# Patient Record
Sex: MALE | Race: WHITE | NOT HISPANIC OR LATINO | Employment: UNEMPLOYED | URBAN - METROPOLITAN AREA
[De-identification: names, ages, dates, MRNs, and addresses within clinical notes are randomized per-mention and may not be internally consistent; named-entity substitution may affect disease eponyms.]

---

## 2022-09-27 ENCOUNTER — OFFICE VISIT (OUTPATIENT)
Dept: FAMILY MEDICINE CLINIC | Facility: CLINIC | Age: 22
End: 2022-09-27

## 2022-09-27 VITALS
SYSTOLIC BLOOD PRESSURE: 142 MMHG | OXYGEN SATURATION: 98 % | DIASTOLIC BLOOD PRESSURE: 96 MMHG | HEART RATE: 114 BPM | TEMPERATURE: 96.8 F | BODY MASS INDEX: 37.19 KG/M2 | WEIGHT: 315 LBS | HEIGHT: 77 IN

## 2022-09-27 DIAGNOSIS — Z76.89 ENCOUNTER TO ESTABLISH CARE: Primary | ICD-10-CM

## 2022-09-27 DIAGNOSIS — E79.0 HYPERURICEMIA: ICD-10-CM

## 2022-09-27 DIAGNOSIS — M10.9 ACUTE GOUT OF RIGHT ANKLE, UNSPECIFIED CAUSE: ICD-10-CM

## 2022-09-27 DIAGNOSIS — M10.9 ACUTE GOUT INVOLVING TOE OF RIGHT FOOT, UNSPECIFIED CAUSE: ICD-10-CM

## 2022-09-27 DIAGNOSIS — S39.012A STRAIN OF MUSCLE, FASCIA AND TENDON OF LOWER BACK, INITIAL ENCOUNTER: ICD-10-CM

## 2022-09-27 DIAGNOSIS — Z71.3 ENCOUNTER FOR WEIGHT LOSS COUNSELING: ICD-10-CM

## 2022-09-27 DIAGNOSIS — Z71.6 ENCOUNTER FOR SMOKING CESSATION COUNSELING: ICD-10-CM

## 2022-09-27 PROCEDURE — 99214 OFFICE O/P EST MOD 30 MIN: CPT | Performed by: STUDENT IN AN ORGANIZED HEALTH CARE EDUCATION/TRAINING PROGRAM

## 2022-09-27 RX ORDER — IBUPROFEN 800 MG/1
800 TABLET ORAL 3 TIMES DAILY
Qty: 30 TABLET | Refills: 1 | Status: SHIPPED | OUTPATIENT
Start: 2022-09-27

## 2022-09-27 NOTE — PATIENT INSTRUCTIONS
Based on your symptoms and exam today, as well as review of your urgent care record, I do believe you are having a gout flare.  Your symptoms appear to be resolving.  I am not going to prescribe anything specifically for your gout today, but will instead wait for lab results.  You should hear back from us in a couple of days.  Also try to limit the purines in your diet to help keep from having more gout flares.  If your symptoms worsen feel free to give us a call or go back to urgent care.    You appear to have a muscle/tendon strain in your back.  Take the 800 mg ibuprofen you have 4 times a day for 5 days.  Also, I put in a referral to physical therapy.  They should call you in the next couple of days to schedule an appointment.  Did take a couple doses of ibuprofen before you go to physical therapy 2.  The ibuprofen should also help any residual gout issues resolve over the next few days.  To take the ibuprofen with food.    Coagulations on making a decision to try to stop smoking.  I think nicotine gum may help you make the change.  Also try to pick a date and work toward cutting back on the nicotine a little at the time.  It sounds like this technique might work best for you.  Try to talk a family member or body into stopping nicotine with you.  It can help to have somebody to do it with.    Also, congratulations on trying to improve your diet.  We can talk more about that in the future.

## 2022-09-27 NOTE — PROGRESS NOTES
Chief Complaint  [facility] (Pt was seen In  on 9/20/22) and Gout    Subjective        Roger Liu presents to [facility]  History of Present Illness  Roger is a new patient presenting to establish care.  He has not had a PCP before.  He recently was at urgent care on 9/20/2022 for right toe pain which was diagnosed as gout.  He was given a prednisone taper and high-dose ibuprofen.  He did not feel the steroid or the ibuprofen did much so he did not take the steroid regularly and only took a couple of the ibuprofen tablets.  At this point his symptoms have improved, however, he now has gouty type pain in his right ankle.  He also has some sensitivity on the ball of his right foot.  He has had similar symptoms several times in his ankle before and reports that happening every 3 to 4 months.  He lets it resolve on its own.  Overall his symptoms are significantly better though.    Roger also complains of low back pain that started about 2 months ago.  There is no known inciting event, however, he lifts a lot at work and knows he does not lift correctly.  He has tried NSAIDs, heat, and ice.  He gets some relief with ice but overall does not feel he is getting any better.  He has also tried switching mattresses and changing shoes and shoe inserts but has not noted improvement.  He also notes that the steroid given in the ER for his gout did not improve his symptoms.    Patient knows he is overweight and needs to lose weight.  He has already cut back from drinking 6-8 sprites before lunch to drinking 3/day.  He is now drinking more water but knows he needs to drink even more.  He does report a diet with significant carbohydrate intake as well as lots of red meat.  He has not linked to any of his diet to his gout attacks.    Patient was a heavy smoker and had smoked well over a pack per day since becoming a teenager.  He has switched to vaping in the past few months and has been  "cutting back on how often he vapes.  Both coworkers and family members smoke.  He has a goal of quitting completely but is having a really hard time with it.    Patient has no significant medical history otherwise.  He lives at home with his mother, father, maternal grandfather, 1 sister and his girlfriend.  He has another sister outside of the house.  All family members and siblings are healthy.  He feels that his girlfriend is a good influence on his health.    Objective   Vital Signs:  /96   Pulse 114   Temp 96.8 °F (36 °C)   Ht 195.6 cm (77.01\")   Wt (!) 146 kg (321 lb 9.6 oz)   SpO2 98%   BMI 38.13 kg/m²   Estimated body mass index is 38.13 kg/m² as calculated from the following:    Height as of this encounter: 195.6 cm (77.01\").    Weight as of this encounter: 146 kg (321 lb 9.6 oz).          Physical Exam  Vitals reviewed.   Constitutional:       General: He is not in acute distress.     Appearance: Normal appearance.   HENT:      Head: Normocephalic and atraumatic.   Eyes:      Extraocular Movements: Extraocular movements intact.      Conjunctiva/sclera: Conjunctivae normal.   Cardiovascular:      Rate and Rhythm: Normal rate and regular rhythm.      Heart sounds: Normal heart sounds. No murmur heard.    No friction rub. No gallop.   Pulmonary:      Effort: Pulmonary effort is normal.      Breath sounds: Normal breath sounds. No wheezing, rhonchi or rales.   Abdominal:      General: Bowel sounds are normal. There is no distension.      Palpations: Abdomen is soft.      Tenderness: There is no abdominal tenderness. There is no right CVA tenderness, left CVA tenderness or guarding.   Musculoskeletal:        Back:       Right lower leg: No edema.      Left lower leg: No edema.        Feet:       Comments: Point tenderness to palpation on T12-S1 and L paraspinal tenderness to L iliac crest, most tender 3cm lateral to lumbosacral spine.  No SI joint tenderness.   Feet:      Comments: Was previously " tender lateral side of R foot as well.  Neurological:      General: No focal deficit present.      Mental Status: He is alert. Mental status is at baseline.   Psychiatric:         Mood and Affect: Mood normal.         Behavior: Behavior normal.        Result Review :                Assessment and Plan   Diagnoses and all orders for this visit:    1. Encounter to establish care (Primary)  -     CBC & Differential  -     Comprehensive Metabolic Panel  -     Lipid Panel  -     TSH Rfx On Abnormal To Free T4    2. Acute gout involving toe of right foot, unspecified cause  -     CBC & Differential  -     Comprehensive Metabolic Panel  -     Uric acid    3. Acute gout of right ankle, unspecified cause    4. Strain of muscle, fascia and tendon of lower back, initial encounter  -     Ambulatory Referral to Physical Therapy Evaluate and treat  -     ibuprofen (ADVIL,MOTRIN) 800 MG tablet; Take 1 tablet by mouth 3 (Three) Times a Day. Take 3 times a day for 5 days.  Then, take no more than 2 times a day as needed.  Take 2 doses prior to going to PT.  Dispense: 30 tablet; Refill: 1    5. Encounter for smoking cessation counseling    6. Encounter for weight loss counseling      Roger is a new patient presenting to establish care.  Medical problems, allergies, history, family history, medications, surgeries reviewed with patient.    Longstanding issues with gout flares happening every 3 to 4 months.  His flare on 9/20/2022 was more severe than most and involved his foot and toe instead of just his ankle.  At this point symptoms are close to resolving.  Checking labs including uric acid levels.  Instructed patient on low purine diet and drinking plenty of water.  Continuing patient's high-dose ibuprofen due to back pain, but this should also help his current gout flare resolve.  We will plan further management pending lab results from blood drawn today.    Left-sided muscle/tendon strain at T12-S2 likely due to repeatedly lifting  inappropriately.  Reminded patient about proper lifting techniques.  Encouraged him to stay as active as he can.  Referring to PT for evaluation and treatment.  Continuing high-dose ibuprofen for the next 5 days.  Patient to take ibuprofen prior to PT visits.    Patient reports switching to vaping to try to quit smoking and says he has cut back significantly on vaping.  Discussed options of quitting cold turkey versus picking a quit date and tapering.  Patient does not feel he could quit cold turkey.  Discussed role of nicotine replacement and changing habits/routines.  Patient feels that nicotine gum could help him with his urge to go vape and would like to try that.  Patient saying he will pick a date and work toward it and may try to partner with his father to quit smoking at the same time since his father has nearly quit.    Educated patient on the importance of reducing calorie intake for losing weight.  Congratulated patient on already cutting back from likely well over 10 cans of Sprite daily to only 3 daily.  Encouraged cutting back further on sugary drinks and carbohydrates/fried foods.  Encouraged the patient to focus on eliminating the sugary drinks because of such a big source of calories for him.       Follow Up   Return in about 4 weeks (around 10/25/2022) for Gout, Back pain, Weight loss, Smoking cessation.  Patient was given instructions and counseling regarding his condition or for health maintenance advice. Please see specific information pulled into the AVS if appropriate.

## 2022-09-28 LAB
ALBUMIN SERPL-MCNC: 5.1 G/DL (ref 4.1–5.2)
ALBUMIN/GLOB SERPL: 1.8 {RATIO} (ref 1.2–2.2)
ALP SERPL-CCNC: 69 IU/L (ref 44–121)
ALT SERPL-CCNC: 29 IU/L (ref 0–44)
AST SERPL-CCNC: 23 IU/L (ref 0–40)
BASOPHILS # BLD AUTO: 0 X10E3/UL (ref 0–0.2)
BASOPHILS NFR BLD AUTO: 0 %
BILIRUB SERPL-MCNC: 0.2 MG/DL (ref 0–1.2)
BUN SERPL-MCNC: 19 MG/DL (ref 6–20)
BUN/CREAT SERPL: 17 (ref 9–20)
CALCIUM SERPL-MCNC: 10.5 MG/DL (ref 8.7–10.2)
CHLORIDE SERPL-SCNC: 99 MMOL/L (ref 96–106)
CHOLEST SERPL-MCNC: 201 MG/DL (ref 100–199)
CO2 SERPL-SCNC: 23 MMOL/L (ref 20–29)
CREAT SERPL-MCNC: 1.14 MG/DL (ref 0.76–1.27)
EGFRCR SERPLBLD CKD-EPI 2021: 93 ML/MIN/1.73
EOSINOPHIL # BLD AUTO: 0 X10E3/UL (ref 0–0.4)
EOSINOPHIL NFR BLD AUTO: 0 %
ERYTHROCYTE [DISTWIDTH] IN BLOOD BY AUTOMATED COUNT: 12.3 % (ref 11.6–15.4)
GLOBULIN SER CALC-MCNC: 2.8 G/DL (ref 1.5–4.5)
GLUCOSE SERPL-MCNC: 96 MG/DL (ref 70–99)
HCT VFR BLD AUTO: 47.4 % (ref 37.5–51)
HDLC SERPL-MCNC: 67 MG/DL
HGB BLD-MCNC: 16 G/DL (ref 13–17.7)
IMM GRANULOCYTES # BLD AUTO: 0 X10E3/UL (ref 0–0.1)
IMM GRANULOCYTES NFR BLD AUTO: 0 %
LDLC SERPL CALC-MCNC: 112 MG/DL (ref 0–99)
LYMPHOCYTES # BLD AUTO: 1.3 X10E3/UL (ref 0.7–3.1)
LYMPHOCYTES NFR BLD AUTO: 11 %
MCH RBC QN AUTO: 27.4 PG (ref 26.6–33)
MCHC RBC AUTO-ENTMCNC: 33.8 G/DL (ref 31.5–35.7)
MCV RBC AUTO: 81 FL (ref 79–97)
MONOCYTES # BLD AUTO: 0.4 X10E3/UL (ref 0.1–0.9)
MONOCYTES NFR BLD AUTO: 4 %
NEUTROPHILS # BLD AUTO: 9.7 X10E3/UL (ref 1.4–7)
NEUTROPHILS NFR BLD AUTO: 85 %
PLATELET # BLD AUTO: 225 X10E3/UL (ref 150–450)
POTASSIUM SERPL-SCNC: 4.9 MMOL/L (ref 3.5–5.2)
PROT SERPL-MCNC: 7.9 G/DL (ref 6–8.5)
RBC # BLD AUTO: 5.83 X10E6/UL (ref 4.14–5.8)
SODIUM SERPL-SCNC: 140 MMOL/L (ref 134–144)
TRIGL SERPL-MCNC: 124 MG/DL (ref 0–149)
TSH SERPL DL<=0.005 MIU/L-ACNC: 1.54 UIU/ML (ref 0.45–4.5)
URATE SERPL-MCNC: 9.6 MG/DL (ref 3.8–8.4)
VLDLC SERPL CALC-MCNC: 22 MG/DL (ref 5–40)
WBC # BLD AUTO: 11.6 X10E3/UL (ref 3.4–10.8)

## 2022-09-30 DIAGNOSIS — M10.9 ACUTE GOUT INVOLVING TOE OF RIGHT FOOT, UNSPECIFIED CAUSE: ICD-10-CM

## 2022-09-30 DIAGNOSIS — E79.0 HYPERURICEMIA: ICD-10-CM

## 2022-09-30 RX ORDER — COLCHICINE 0.6 MG/1
0.6 TABLET ORAL DAILY
Qty: 90 TABLET | Refills: 0 | Status: SHIPPED | OUTPATIENT
Start: 2022-09-30 | End: 2022-10-03

## 2022-10-03 RX ORDER — COLCHICINE 0.6 MG/1
TABLET ORAL
Qty: 92 TABLET | Refills: 0 | Status: SHIPPED | OUTPATIENT
Start: 2022-10-03 | End: 2023-02-28 | Stop reason: SDUPTHER

## 2023-02-28 ENCOUNTER — TELEPHONE (OUTPATIENT)
Dept: FAMILY MEDICINE CLINIC | Facility: CLINIC | Age: 23
End: 2023-02-28

## 2023-02-28 DIAGNOSIS — M10.9 ACUTE GOUT, UNSPECIFIED CAUSE, UNSPECIFIED SITE: Primary | ICD-10-CM

## 2023-02-28 DIAGNOSIS — M10.9 ACUTE GOUT INVOLVING TOE OF RIGHT FOOT, UNSPECIFIED CAUSE: ICD-10-CM

## 2023-02-28 DIAGNOSIS — E79.0 HYPERURICEMIA: ICD-10-CM

## 2023-02-28 DIAGNOSIS — M10.9 ACUTE GOUT, UNSPECIFIED CAUSE, UNSPECIFIED SITE: ICD-10-CM

## 2023-02-28 RX ORDER — COLCHICINE 0.6 MG/1
0.6 TABLET ORAL SEE ADMIN INSTRUCTIONS
Qty: 92 TABLET | Refills: 0 | Status: SHIPPED | OUTPATIENT
Start: 2023-02-28

## 2023-02-28 NOTE — TELEPHONE ENCOUNTER
Patient notified about his prescription sent to his pharmacy. He was offered to make an appointment for tomorrow, but he stated that he was unable because he has to work tomorrow. He was advised to call the office and make an appointment according to his availability.

## 2023-02-28 NOTE — TELEPHONE ENCOUNTER
Caller: Roger Liu    Relationship: Self    Best call back number:[phone]     Requested Prescriptions: GOUT MEDICATION       Pharmacy where request should be sent:  [facility] #[id number] - [address] - [phone] PH - [phone] FX  [phone]:      Additional details provided by patient: PATIENT IS CALLING TO STATE HE HAS A FLARE UP OF GOUT, THAT HIS RIGHT FOOT IS SWOLLEN AND HE CAN HARDLY WALK.    Does the patient have less than a 3 day supply:  [x] Yes  [] No    Would you like a call back once the refill request has been completed: [x] Yes [] No    If the office needs to give you a call back, can they leave a voicemail: [x] Yes [] No    Fiordaliza Ness, TerrenceSched Rep   02/28/23 14:53 EST     PLEASE ADVISE.

## 2023-02-28 NOTE — TELEPHONE ENCOUNTER
Please notify patient that I sent in a refill for the colchicine for his gout flare.  I have room in my schedule tomorrow to see him for ongoing management of his gout.  Please make an appointment for him tomorrow.  Thank you.

## 2023-03-01 RX ORDER — COLCHICINE 0.6 MG/1
TABLET ORAL
Qty: 267 TABLET | OUTPATIENT
Start: 2023-03-01

## 2023-07-29 ENCOUNTER — APPOINTMENT (OUTPATIENT)
Dept: GENERAL RADIOLOGY | Facility: HOSPITAL | Age: 23
End: 2023-07-29
Payer: COMMERCIAL

## 2023-07-29 ENCOUNTER — HOSPITAL ENCOUNTER (EMERGENCY)
Facility: HOSPITAL | Age: 23
Discharge: HOME | End: 2023-07-30
Attending: EMERGENCY MEDICINE | Admitting: EMERGENCY MEDICINE
Payer: COMMERCIAL

## 2023-07-29 VITALS
OXYGEN SATURATION: 97 % | HEIGHT: 76 IN | HEART RATE: 88 BPM | WEIGHT: 300 LBS | BODY MASS INDEX: 36.53 KG/M2 | RESPIRATION RATE: 18 BRPM | TEMPERATURE: 98.4 F | DIASTOLIC BLOOD PRESSURE: 98 MMHG | SYSTOLIC BLOOD PRESSURE: 157 MMHG

## 2023-07-29 DIAGNOSIS — S42.032A CLOSED DISPLACED FRACTURE OF ACROMIAL END OF LEFT CLAVICLE, INITIAL ENCOUNTER: Primary | ICD-10-CM

## 2023-07-29 PROCEDURE — 99283 EMERGENCY DEPT VISIT LOW MDM: CPT

## 2023-07-29 PROCEDURE — 73000 X-RAY EXAM OF COLLAR BONE: CPT

## 2023-07-29 PROCEDURE — 73030 X-RAY EXAM OF SHOULDER: CPT

## 2023-07-30 RX ORDER — ACETAMINOPHEN 500 MG
1000 TABLET ORAL ONCE
Status: COMPLETED | OUTPATIENT
Start: 2023-07-30 | End: 2023-07-30

## 2023-07-30 RX ORDER — OXYCODONE HYDROCHLORIDE AND ACETAMINOPHEN 5; 325 MG/1; MG/1
1 TABLET ORAL EVERY 6 HOURS PRN
Qty: 12 TABLET | Refills: 0 | Status: SHIPPED | OUTPATIENT
Start: 2023-07-30 | End: 2023-08-02

## 2023-07-30 RX ADMIN — ACETAMINOPHEN 1000 MG: 500 TABLET ORAL at 00:22

## 2023-12-10 ENCOUNTER — HOSPITAL ENCOUNTER (EMERGENCY)
Facility: HOSPITAL | Age: 23
Discharge: HOME | End: 2023-12-10
Attending: EMERGENCY MEDICINE | Admitting: EMERGENCY MEDICINE
Payer: COMMERCIAL

## 2023-12-10 ENCOUNTER — APPOINTMENT (OUTPATIENT)
Dept: CT IMAGING | Facility: HOSPITAL | Age: 23
End: 2023-12-10
Payer: COMMERCIAL

## 2023-12-10 VITALS
BODY MASS INDEX: 34 KG/M2 | TEMPERATURE: 98.8 F | OXYGEN SATURATION: 99 % | WEIGHT: 288 LBS | HEART RATE: 80 BPM | DIASTOLIC BLOOD PRESSURE: 106 MMHG | RESPIRATION RATE: 20 BRPM | HEIGHT: 77 IN | SYSTOLIC BLOOD PRESSURE: 158 MMHG

## 2023-12-10 DIAGNOSIS — S39.012A BACK STRAIN, INITIAL ENCOUNTER: Primary | ICD-10-CM

## 2023-12-10 LAB
BILIRUB UR QL STRIP: NEGATIVE
CLARITY UR: CLEAR
COLOR UR: YELLOW
GLUCOSE UR STRIP-MCNC: NEGATIVE MG/DL
HGB UR QL STRIP.AUTO: NEGATIVE
KETONES UR QL STRIP: NEGATIVE
LEUKOCYTE ESTERASE UR QL STRIP.AUTO: NEGATIVE
NITRITE UR QL STRIP: NEGATIVE
PH UR STRIP.AUTO: 5.5 [PH] (ref 5–8)
PROT UR QL STRIP: NEGATIVE
SP GR UR STRIP: 1.02 (ref 1–1.03)
UROBILINOGEN UR QL STRIP: NORMAL

## 2023-12-10 PROCEDURE — 25010000002 KETOROLAC TROMETHAMINE PER 15 MG: Performed by: EMERGENCY MEDICINE

## 2023-12-10 PROCEDURE — 81003 URINALYSIS AUTO W/O SCOPE: CPT | Performed by: EMERGENCY MEDICINE

## 2023-12-10 PROCEDURE — 96372 THER/PROPH/DIAG INJ SC/IM: CPT

## 2023-12-10 PROCEDURE — 63710000001 PREDNISONE PER 5 MG: Performed by: EMERGENCY MEDICINE

## 2023-12-10 PROCEDURE — 74176 CT ABD & PELVIS W/O CONTRAST: CPT

## 2023-12-10 PROCEDURE — 99284 EMERGENCY DEPT VISIT MOD MDM: CPT

## 2023-12-10 RX ORDER — CYCLOBENZAPRINE HCL 10 MG
10 TABLET ORAL 3 TIMES DAILY PRN
Qty: 21 TABLET | Refills: 0 | Status: SHIPPED | OUTPATIENT
Start: 2023-12-10 | End: 2023-12-17

## 2023-12-10 RX ORDER — KETOROLAC TROMETHAMINE 30 MG/ML
60 INJECTION, SOLUTION INTRAMUSCULAR; INTRAVENOUS ONCE
Status: COMPLETED | OUTPATIENT
Start: 2023-12-10 | End: 2023-12-10

## 2023-12-10 RX ORDER — METHOCARBAMOL 500 MG/1
500 TABLET, FILM COATED ORAL ONCE
Status: COMPLETED | OUTPATIENT
Start: 2023-12-10 | End: 2023-12-10

## 2023-12-10 RX ORDER — DICLOFENAC SODIUM 75 MG/1
75 TABLET, DELAYED RELEASE ORAL 2 TIMES DAILY
Qty: 20 TABLET | Refills: 0 | Status: SHIPPED | OUTPATIENT
Start: 2023-12-10 | End: 2023-12-20

## 2023-12-10 RX ORDER — PREDNISONE 20 MG/1
TABLET ORAL
Qty: 12 TABLET | Refills: 0 | Status: SHIPPED | OUTPATIENT
Start: 2023-12-10

## 2023-12-10 RX ADMIN — PREDNISONE 60 MG: 10 TABLET ORAL at 23:09

## 2023-12-10 RX ADMIN — METHOCARBAMOL TABLETS 500 MG: 500 TABLET, COATED ORAL at 23:10

## 2023-12-10 RX ADMIN — KETOROLAC TROMETHAMINE 60 MG: 30 INJECTION, SOLUTION INTRAMUSCULAR; INTRAVENOUS at 21:40

## 2023-12-10 NOTE — Clinical Note
[facility]  [address]  [place] [address]    Roger Liu was seen and treated in our emergency department on 12/10/2023.  He may return to work on 12/12/2023.         Thank you for choosing [facility].    Nima Shah MD

## 2023-12-11 NOTE — DISCHARGE INSTRUCTIONS
Today the urinalysis is normal.  We do not see any evidence of infection or blood.    Your CAT scan of your abdomen and pelvis does not reveal any evidence of kidney stone.  I do think your pain is musculoskeletal in nature coming from your back.    We did go and initiate some steroids tonight which will reduce the inflammation significantly.  I also sent in 3 medications to your pharmacy.  Take as directed    The muscle relaxant is cyclobenzaprine also known as Flexeril.  It is slightly sedating so if you are going to work you may want to hold that particular medication.    Return for any worsening signs or symptoms    Please read all of the instructions in this handout.  If you receive prescriptions please fill them and take them as directed.  Please call your primary care physician for follow-up appointment in the next 5 to 7 days.  If you do not have a physician you may call the [facility] referral line at [phone].    You may return to the emergency department at any time for any concerns such as worsening symptoms.  If you received a work or school note it will be printed at the back of this packet.

## 2023-12-11 NOTE — FSED PROVIDER NOTE
Subjective   History of Present Illness  Patient is a 23-year-old male.  He presents with a 2-day history of right flank pain.  No injury.  No radiation to the inguinal or abdominal area.  No nausea vomiting.  No leslye hematuria.  No fever no chills.  He has no history of kidney stones.  The pain is somewhat sharp.  It is intermittent.  No numbness or tingling into his extremities.  No shortness of breath      Review of Systems  Constitutional: No fevers, chills, sweats unless otherwise documented in HPI  Eyes: No recent visual problems, eye discharge, eye pain, redness unless otherwise documented in HPI  HEENT: No ear pain, nasal congestion, sore throat, voice changes unless otherwise documented in HPI  Respiratory: No shortness of breath, cough, pain on breathing, sputum production unless otherwise documented in HPI  Cardiovascular: No chest pain, palpitations, syncope, orthopnea unless otherwise documented in HPI  Gastrointestinal: No nausea, vomiting, diarrhea, constipation unless otherwise documented in HPI  Genitourinary: No hematuria, dysuria, incontinence unless otherwise documented in HPI  Endocrine: Negative for excessive thirst, excessive hunger, excessive urination, heat or cold intolerance unless otherwise documented in HPI  Musculoskeletal: No back pain, neck pain, joint pain, muscle pain, decreased range of motion unless otherwise documented in HPI  Integumentary: No rash, pruritus, abrasion, lesions unless otherwise documented in HPI  Neurologic: No weakness, numbness, frequent headaches, tremors unless otherwise documented in HPI  Psychiatric: No anxiety, depression, mood changes, hallucinations unless otherwise documented in HPI          History reviewed. No pertinent past medical history.    No Known Allergies    Past Surgical History:   Procedure Laterality Date    HAND SURGERY Right        History reviewed. No pertinent family history.    Social History     Socioeconomic History    Marital  status: Single   Tobacco Use    Smokeless tobacco: Never    Tobacco comments:     I wish i could stop   Vaping Use    Vaping Use: Every day   Substance and Sexual Activity    Alcohol use: Not Currently    Drug use: No    Sexual activity: Yes     Partners: Female     Birth control/protection: Condom, Same-sex partner           Objective   Physical Exam  Vital signs: Reviewed in nurses notes    General: Awake alert.  He does appear to be uncomfortable    HEENT: Pupils equal round responsive to light.  Extra-ocular movements are intact.  No scleral icterus.  Nasopharynx is clear.  Oropharynx is clear with moist mucous membranes.  No masses noted    Neck:   Supple without lymphadenopathy    Respiratory:   Nonlabored respirations.  Clear to auscultation bilaterally.  Equal breath sounds bilaterally.  No wheezes or stridor noted.    Cardiovascular: Regular rate and rhythm.  No murmur.  Equal pulses in bilateral lower extremities without edema.    Abdomen: Soft nondistended nontender notes 4 quadrants    Back: There is mild right CVA tenderness noted.  There is also some right parathoracic muscular tenderness noted    Skin:   Warm and dry.  No rashes noted    Neurological examination: Patient is awake alert oriented x4.  Speech is normal.  No facial palsy.  No focal motor or sensory deficits.    Procedures           ED Course    Medications   ketorolac (TORADOL) injection 60 mg (60 mg Intramuscular Given 12/10/23 2140)   predniSONE (DELTASONE) tablet 60 mg (60 mg Oral Given 12/10/23 2309)   methocarbamol (ROBAXIN) tablet 500 mg (500 mg Oral Given 12/10/23 2310)          Lab Results (last 72 hours)       Procedure Component Value Units Date/Time    Urinalysis With Culture If Indicated - Urine, Clean Catch [[id number]]  (Normal) Collected: 12/10/23 2130    Specimen: Urine, Clean Catch Updated: 12/10/23 2133     Color, UA Yellow     Appearance, UA Clear     pH, UA 5.5     Specific Gravity, UA 1.025     Glucose, UA Negative      Ketones, UA Negative     Bilirubin, UA Negative     Blood, UA Negative     Protein, UA Negative     Leuk Esterase, UA Negative     Nitrite, UA Negative     Urobilinogen, UA 0.2 E.U./dL    Narrative:      In absence of clinical symptoms, the presence of pyuria, bacteria, and/or nitrites on the urinalysis result does not correlate with infection.  Urine microscopic not indicated.             CT Abdomen Pelvis Stone Protocol    Result Date: 12/10/2023  CT OF THE ABDOMEN PELVIS WITHOUT CONTRAST  HISTORY: Right flank pain  COMPARISON: None available.  TECHNIQUE: Axial CT imaging was obtained through the abdomen and pelvis. No IV contrast was administered.  FINDINGS: Images through the lung bases are clear. No suspicious hepatic lesions are seen. Liver is enlarged, measuring up to 17.8 cm in craniocaudal dimensions. The spleen is enlarged, measuring up to 16 cm in AP dimensions. The stomach, duodenum, adrenal glands, pancreas, and gallbladder are all normal. The kidneys enhance symmetrically. No hydronephrosis is identified. No renal stones are noted. No distal ureteral or bladder stones are seen. Prostate gland is within normal limits. Urinary bladder is contracted. The patient does have colonic diverticulosis. The appendix is normal. There is a tiny fat-containing umbilical hernia. No acute osseous abnormalities are seen.      Hepatosplenomegaly.  Radiation dose reduction techniques were utilized, including automated exposure control and exposure modulation based on body size.   This report was finalized on 12/10/2023 10:45 PM by Dr. Torri Amador M.D on Workstation: [id number]                                       DDx: Kidney stone, musculoskeletal pain  Medical Decision Making  Problems Addressed:  Back strain, initial encounter: complicated acute illness or injury    Amount and/or Complexity of Data Reviewed  Radiology: ordered.    Risk  Prescription drug management.    The CAT scan was independently reviewed  in addition to the review of the radiologist interpretation    Final diagnoses:   Back strain, initial encounter       ED Disposition  ED Disposition       ED Disposition   Discharge    Condition   Stable    Comment   --               Herson Bustos MD  [address]  [address]  [place] [id number] [phone]    In 1 week           Medication List        New Prescriptions      cyclobenzaprine 10 MG tablet  Commonly known as: FLEXERIL  Take 1 tablet by mouth 3 (Three) Times a Day As Needed for Muscle Spasms for up to 7 days.     diclofenac 75 MG EC tablet  Commonly known as: VOLTAREN  Take 1 tablet by mouth 2 (Two) Times a Day for 10 days.     predniSONE 20 MG tablet  Commonly known as: DELTASONE  3 po daily x 2d, then 2 po daily x 2d, then 1 po daily x 2d.               Where to Get Your Medications        These medications were sent to [facility] #[id number] - [place], [place] - [address] AT [address] - [phone]  - [phone]   [address], [place] [address]      Phone: [phone]   cyclobenzaprine 10 MG tablet  diclofenac 75 MG EC tablet  predniSONE 20 MG tablet

## 2024-03-05 ENCOUNTER — OFFICE VISIT (OUTPATIENT)
Dept: FAMILY MEDICINE CLINIC | Facility: CLINIC | Age: 24
End: 2024-03-05
Payer: COMMERCIAL

## 2024-03-05 VITALS
DIASTOLIC BLOOD PRESSURE: 84 MMHG | HEART RATE: 98 BPM | BODY MASS INDEX: 35.85 KG/M2 | SYSTOLIC BLOOD PRESSURE: 126 MMHG | OXYGEN SATURATION: 97 % | HEIGHT: 77 IN | WEIGHT: 303.6 LBS | TEMPERATURE: 98 F

## 2024-03-05 DIAGNOSIS — R53.83 OTHER FATIGUE: ICD-10-CM

## 2024-03-05 DIAGNOSIS — N52.9 ERECTILE DYSFUNCTION, UNSPECIFIED ERECTILE DYSFUNCTION TYPE: ICD-10-CM

## 2024-03-05 DIAGNOSIS — E55.9 VITAMIN D DEFICIENCY: ICD-10-CM

## 2024-03-05 DIAGNOSIS — E66.1 CLASS 2 DRUG-INDUCED OBESITY WITH SERIOUS COMORBIDITY AND BODY MASS INDEX (BMI) OF 36.0 TO 36.9 IN ADULT: ICD-10-CM

## 2024-03-05 DIAGNOSIS — Z87.39 HISTORY OF GOUT: ICD-10-CM

## 2024-03-05 DIAGNOSIS — Z71.6 ENCOUNTER FOR SMOKING CESSATION COUNSELING: ICD-10-CM

## 2024-03-05 DIAGNOSIS — E78.00 HYPERCHOLESTEROLEMIA: ICD-10-CM

## 2024-03-05 DIAGNOSIS — R29.898 WRIST WEAKNESS: Primary | ICD-10-CM

## 2024-03-05 RX ORDER — SILDENAFIL 50 MG/1
50 TABLET, FILM COATED ORAL DAILY PRN
Qty: 30 TABLET | Refills: 0 | Status: SHIPPED | OUTPATIENT
Start: 2024-03-05 | End: 2024-03-11

## 2024-03-05 NOTE — PROGRESS NOTES
"Chief Complaint  Fatigue (Feeling lazy and unfocused, requesting testosterone check) and Extremity Weakness    Subjective        Roger Liu presents to [facility]  History of Present Illness  Roger is an established patient presenting for left hand weakness and fatigue.  He has hypercholesterolemia.  Since last visit he had a laceration in his left wrist 5/2023 and fractured left clavicle 7/2023.    Left hand weakness: Hand has not recovered full strength following laceration 5/2023 (5/11/2023 and 5/19/2023 notes reviewed).  Today, he has good mobility but residual weakness.    Fatigue: Has had low energy levels for about a year and is now having issues with erectile dysfunction for the past few months.  He asks to have testosterone checked.  He was in a relationship that ended about a year ago, but he denies any current anxiety/depression/emotional symptoms around that.  He has been with his current partner for about 5 months and feels that relationship is good.  He is living at home with his mother and father.  He is working the [employer] at the [employer].    Smoking cessation: He reports he has stopped smoking but he has been vaping about 1 card every 10 days.    Diet: He has lost 18 pounds over the past year.  He was down 33 pounds.  He reports cutting calories from about 5500/day to 2500/day and specifically cut out all of the soda he was drinking (1 point 6/day).  He is walking, biking, going to the gym and swimming.    Gout: He has not had flares for about 10 months and is not taking any medications.  He did buy special gout shoes.  Flares are typically in the right ankle/foot.    Objective   Vital Signs:  /84   Pulse 98   Temp 98 °F (36.7 °C)   Ht 195.6 cm (77\")   Wt (!) 138 kg (303 lb 9.6 oz)   SpO2 97%   BMI 36.00 kg/m²   Estimated body mass index is 36 kg/m² as calculated from the following:    Height as of this encounter: 195.6 cm (77\").    " Weight as of this encounter: 138 kg (303 lb 9.6 oz).       Class 2 Severe Obesity (BMI >=35 and <=39.9). Obesity-related health conditions include the following: dyslipidemias. Obesity is improving with lifestyle modifications. BMI is is above average; BMI management plan is completed. We discussed portion control and increasing exercise.      Physical Exam  Vitals reviewed.   Constitutional:       General: He is not in acute distress.     Appearance: Normal appearance.   HENT:      Head: Normocephalic and atraumatic.   Eyes:      Extraocular Movements: Extraocular movements intact.      Conjunctiva/sclera: Conjunctivae normal.   Cardiovascular:      Rate and Rhythm: Normal rate and regular rhythm.      Heart sounds: Normal heart sounds. No murmur heard.     No friction rub. No gallop.   Pulmonary:      Effort: Pulmonary effort is normal.      Breath sounds: Normal breath sounds. No wheezing, rhonchi or rales.   Abdominal:      General: Bowel sounds are normal.      Palpations: Abdomen is soft.   Musculoskeletal:      Right hand: Normal strength.      Left hand: Decreased strength.   Neurological:      General: No focal deficit present.      Mental Status: He is alert. Mental status is at baseline.   Psychiatric:         Mood and Affect: Mood normal.         Behavior: Behavior normal.        Result Review :                     Assessment and Plan     Diagnoses and all orders for this visit:    1. Wrist weakness (Primary)  -     Ambulatory Referral to Occupational Therapy    2. Other fatigue  -     CBC & Differential; Future  -     Comprehensive Metabolic Panel; Future  -     TSH Rfx On Abnormal To Free T4; Future  -     Testosterone; Future  -     Vitamin D,25-Hydroxy; Future    3. Erectile dysfunction, unspecified erectile dysfunction type  -     Discontinue: sildenafil (Viagra) 50 MG tablet; Take 1 tablet by mouth Daily As Needed for Erectile Dysfunction.  Dispense: 30 tablet; Refill: 0  -     sildenafil (VIAGRA)  100 MG tablet; Take 1 tablet by mouth Daily As Needed for Erectile Dysfunction.  Dispense: 30 tablet; Refill: 3    4. Encounter for smoking cessation counseling    5. Hypercholesterolemia  -     Lipid Panel; Future    6. Vitamin D deficiency  -     Vitamin D,25-Hydroxy; Future  -     Discontinue: Cholecalciferol (Vitamin D) 50 MCG (2000 UT) tablet; Take 1 tablet by mouth Daily.  Dispense: 90 tablet; Refill: 3  -     Cholecalciferol (Vitamin D) 50 MCG (2000 UT) tablet; Take 1 tablet by mouth Daily.  Dispense: 90 tablet; Refill: 3    7. History of gout    8. Class 2 drug-induced obesity with serious comorbidity and body mass index (BMI) of 36.0 to 36.9 in adult    Left hand weakness: Hand has not recovered full strength following laceration 5/2023.  He has good mobility but residual weakness.  His hand weakness is affecting his ability to work in other positions at his job.  Providing referral to OT today.  Will consider referral to hand specialist pending OT results.    Fatigue/ED: Checking labs to try to identify an organic cause for his fatigue.  He has improved his diet and is working out regularly.  Mental status seems to be good.  BP is good in clinic today.  Will provide a prescription for Viagra since his symptoms are causing him significant distress.    Smoking cessation: Discussed needing to wean off of nicotine as well as break the psychological habit.  Discussed substitution of TicTac's when he has the urge to reach for his vapes.  He is committed to working hard to achieve smoking cessation by his next birthday.    Diet: Encouraged continued improvement in diet and continuing exercise.  Congratulated him on his weight loss today.    Gout: Has been asymptomatic for almost a year now.  Reduction in frequency of flares likely due to his dietary changes.  Further management when patient requests.    Roger Liu  reports that he has been smoking electronic cigarette and cigarettes. He started smoking  about 7 years ago. He has a 23.6 pack-year smoking history. He has never used smokeless tobacco.. I have educated him on the risk of diseases from using tobacco products such as cancer, COPD, and heart disease.     I advised him to quit and he is willing to quit. We have discussed the following method/s for tobacco cessation:  Counseling and substituting TicTac's for the urge to vape .  Together we have set a quit date for  4/12/2020 (his birthday) .  He will follow up with me in 4 weeks or sooner to check on his progress.    I spent 4 minutes counseling the patient.    03/07/24  3/6/2024 labs showing normal CBC, CMP, TSH, low vitamin D, elevated total cholesterol, LDL, and HDL, slightly low testosterone.  Will add vitamin D supplement and encourage lifestyle changes to help with testosterone levels.    03/11/24  Talked with patient on phone and provided lab results.  He indicated Viagra 50 mg is working well, 25 mg was not adequate.  Asked for refills.  Refill sent.         Follow Up     Return in about 4 weeks (around 4/2/2024) for Annual physical, Energy.  Patient was given instructions and counseling regarding his condition or for health maintenance advice. Please see specific information pulled into the AVS if appropriate.

## 2024-03-07 PROBLEM — E55.9 VITAMIN D DEFICIENCY: Status: ACTIVE | Noted: 2024-03-07

## 2024-03-07 RX ORDER — CHOLECALCIFEROL (VITAMIN D3) 50 MCG
2000 TABLET ORAL DAILY
Qty: 90 TABLET | Refills: 3 | Status: SHIPPED | OUTPATIENT
Start: 2024-03-07 | End: 2024-03-08

## 2024-03-08 ENCOUNTER — TELEPHONE (OUTPATIENT)
Dept: FAMILY MEDICINE CLINIC | Facility: CLINIC | Age: 24
End: 2024-03-08
Payer: COMMERCIAL

## 2024-03-08 RX ORDER — CHOLECALCIFEROL (VITAMIN D3) 50 MCG
2000 TABLET ORAL DAILY
Qty: 90 TABLET | Refills: 3 | Status: SHIPPED | OUTPATIENT
Start: 2024-03-08 | End: 2025-03-08

## 2024-03-08 NOTE — TELEPHONE ENCOUNTER
Ricardo sent a fax asking if you want Vitamin D3, or D2.  Stated both are available as 2000 units. Please advise

## 2024-03-11 ENCOUNTER — TELEPHONE (OUTPATIENT)
Dept: FAMILY MEDICINE CLINIC | Facility: CLINIC | Age: 24
End: 2024-03-11

## 2024-03-11 RX ORDER — SILDENAFIL 100 MG/1
100 TABLET, FILM COATED ORAL DAILY PRN
Qty: 30 TABLET | Refills: 3 | Status: SHIPPED | OUTPATIENT
Start: 2024-03-11

## 2024-03-11 NOTE — TELEPHONE ENCOUNTER
Results are there and I can see comment but it has not come back to my basket so I can call pt and leave result note

## 2024-03-11 NOTE — TELEPHONE ENCOUNTER
Caller: Roger Liu    Relationship: Self    Best call back number: [phone]     Caller requesting test results: PATIENT    What test was performed: LABS    When was the test performed: 03/06/24    Where was the test performed: IN OFFICE    Additional notes: PATIENT IS CALLING IN FOR THE RESULTS OF THESE LABS. PLEASE CALL PATIENT WHEN THEY HAVE BEEN RELEASED FROM DR THOMPSON.

## 2024-04-02 ENCOUNTER — OFFICE VISIT (OUTPATIENT)
Dept: FAMILY MEDICINE CLINIC | Facility: CLINIC | Age: 24
End: 2024-04-02
Payer: COMMERCIAL

## 2024-04-02 VITALS
DIASTOLIC BLOOD PRESSURE: 92 MMHG | HEIGHT: 77 IN | HEART RATE: 97 BPM | OXYGEN SATURATION: 98 % | TEMPERATURE: 98.4 F | SYSTOLIC BLOOD PRESSURE: 132 MMHG | WEIGHT: 298.8 LBS | BODY MASS INDEX: 35.28 KG/M2

## 2024-04-02 DIAGNOSIS — R06.81 WITNESSED APNEIC SPELLS: ICD-10-CM

## 2024-04-02 DIAGNOSIS — E55.9 VITAMIN D DEFICIENCY: ICD-10-CM

## 2024-04-02 DIAGNOSIS — F41.9 ANXIETY: ICD-10-CM

## 2024-04-02 DIAGNOSIS — F51.01 PRIMARY INSOMNIA: ICD-10-CM

## 2024-04-02 DIAGNOSIS — N52.9 ERECTILE DYSFUNCTION, UNSPECIFIED ERECTILE DYSFUNCTION TYPE: ICD-10-CM

## 2024-04-02 DIAGNOSIS — Z00.00 ENCOUNTER FOR ANNUAL PHYSICAL EXAM: Primary | ICD-10-CM

## 2024-04-02 DIAGNOSIS — R53.83 OTHER FATIGUE: ICD-10-CM

## 2024-04-02 DIAGNOSIS — Z11.59 ENCOUNTER FOR HEPATITIS C SCREENING TEST FOR LOW RISK PATIENT: ICD-10-CM

## 2024-04-02 RX ORDER — HYDROXYZINE HYDROCHLORIDE 25 MG/1
25 TABLET, FILM COATED ORAL
Qty: 30 TABLET | Refills: 1 | Status: SHIPPED | OUTPATIENT
Start: 2024-04-02

## 2024-04-02 RX ORDER — CHOLECALCIFEROL (VITAMIN D3) 125 MCG
5 CAPSULE ORAL NIGHTLY
Qty: 30 TABLET | Refills: 5 | Status: SHIPPED | OUTPATIENT
Start: 2024-04-02

## 2024-04-02 NOTE — PROGRESS NOTES
"Chief Complaint  Annual Exam    Subjective        Roger Liu presents to [facility]  History of Present Illness  Roger is an established patient presenting with his significant other for annual physical and fatigue.  He also has concerns regarding ED, Anxiety/insomnia, Inattentiveness, Apneic episodes, Weight management, Burdick growth.  He has hypercholesterolemia, vitamin D deficiency.  3/6/2024 labs showing normal CBC, CMP, TSH, low vitamin D, elevated total cholesterol, LDL, and HDL, slightly low testosterone.    Fatigue: Today, he reports fatigue has worsened since 3/5/2024 visit.  He feels that he drags more, has inattentiveness at work.    ED: His #1 concern.  Is now having to take 100 mg Viagra to perform adequately.  That dosing causes flushing and nasal congestion, but not significantly worse than the 50 mg dosage.  Would like to last longer.    Insomnia/anxiety/inattentiveness/apneic episodes: He has excessive worry, especially when trying to sleep at night.  Worries about finances, becoming a father, job.  Reports good sleep hygiene with winding down an hour before bed, sleeping in a cool/dark/quiet room.  Significant other has noted apneic events when sleeping.  Patient has also reported inattentiveness/lack of focus during the day at work.    Weight management: Is uncomfortable with his current weight and would really like to lose weight.  Asked about options.  Wants to lose significant weight immediately.  Denies personal or family history of thyroid issues and personal history of pancreatitis.    Beard growth: Would like to be able to grow a beard    FH:     Medical: n/a    Siblings: 2 sisters, healthy, estranged from 1    Children: 0    Reproductive: History of MSW, monogamous with girlfriend, no concerns of STIs today, declines testing.      Objective   Vital Signs:  /92   Pulse 97   Temp 98.4 °F (36.9 °C)   Ht 195.6 cm (77\")   Wt 136 kg (298 lb 12.8 oz)  " " SpO2 98%   BMI 35.43 kg/m²   Estimated body mass index is 35.43 kg/m² as calculated from the following:    Height as of this encounter: 195.6 cm (77\").    Weight as of this encounter: 136 kg (298 lb 12.8 oz).               Physical Exam  Vitals and nursing note reviewed.   Constitutional:       General: He is not in acute distress.     Appearance: Normal appearance.   HENT:      Head: Normocephalic and atraumatic.   Eyes:      Extraocular Movements: Extraocular movements intact.      Conjunctiva/sclera: Conjunctivae normal.   Cardiovascular:      Rate and Rhythm: Normal rate and regular rhythm.      Pulses: Normal pulses.      Heart sounds: Normal heart sounds. No murmur heard.     No friction rub. No gallop.   Pulmonary:      Effort: Pulmonary effort is normal.      Breath sounds: Normal breath sounds. No wheezing, rhonchi or rales.   Abdominal:      General: Bowel sounds are normal. There is no distension.      Palpations: Abdomen is soft.      Tenderness: There is no abdominal tenderness. There is no right CVA tenderness, left CVA tenderness or guarding.   Musculoskeletal:      Right lower leg: No edema.      Left lower leg: No edema.   Skin:     General: Skin is warm and dry.   Neurological:      General: No focal deficit present.      Mental Status: He is alert. Mental status is at baseline.   Psychiatric:         Mood and Affect: Mood normal.         Behavior: Behavior normal.        Result Review :                   Assessment and Plan   Diagnoses and all orders for this visit:    1. Encounter for annual physical exam (Primary)    2. Other fatigue  -     Testosterone  -     CBC w AUTO Differential  -     Basic metabolic panel    3. Witnessed apneic spells  -     Ambulatory Referral to Sleep Medicine    4. Primary insomnia  -     melatonin 5 MG tablet tablet; Take 1 tablet by mouth Every Night.  Dispense: 30 tablet; Refill: 5  -     hydrOXYzine (ATARAX) 25 MG tablet; Take 1 tablet by mouth every night at " bedtime.  Dispense: 30 tablet; Refill: 1    5. Anxiety    6. Erectile dysfunction, unspecified erectile dysfunction type  -     Testosterone  -     CBC w AUTO Differential  -     Basic metabolic panel    7. Vitamin D deficiency    8. Encounter for hepatitis C screening test for low risk patient  -     Hepatitis C Antibody    He also has concerns regarding ED, Anxiety/insomnia, Inattentiveness, Apneic episodes, Weight management, Burdick growth.  He has hypercholesterolemia, vitamin D deficiency.  3/6/2024 labs showing normal CBC, CMP, TSH, low vitamin D, elevated total cholesterol, LDL, and HDL, slightly low testosterone.    Fatigue: Checking baseline labs and testosterone.    ED: Patient will continue current Viagra.  Recommended trying 75 mg instead of 100 due to side effects.  Checking baseline labs and testosterone.    Insomnia/anxiety/inattentiveness/apneic episodes: Prescribing melatonin and hydroxyzine to try to help with sleep.  Also, due to witnessed apneic episodes and increased fatigue, referral for sleep study.  Further management of anxiety/inattentiveness at future visit.    Beard growth: Will discuss at future visit.         Social/Preventative:    Risks: Hypercholesterolemia, weight management.    AAA screen: At 65    PSA: N/A    Colonoscopy: N/A    Vaccinations: Tetanus 5/2022.  No history of influenza or COVID.    Eye: Deferred    Dental: Deferred    Foot exams: N/A    Nicotine: Started 5/1/2016, switched to e-cigarettes 3/1/2023, 3 PPD, 24PY.  Continues to vape.    Illicit drugs: none, no marijuana    EtOH: none, told to cut back after HS    Home: Lives at home with girlfriend, mother, father, MGM, sister, feels safe.  Estranged from 1 sister.    Work:  Working [employer], parts, but transitioning to maintenance.    Social: wants to start    Exercise: At work.  No formal routine.    Diet: Carbs, red meat, 3 sugary drinks,  per day, little water.  Eats what is handed to him.  Shared whole food  plant-based diet handout and reviewed with patient.  Patient very motivated to lose weight.  Also very interested in medication options.  Will discuss at future visit.      Current Outpatient Medications:     Cholecalciferol (Vitamin D) 50 MCG (2000 UT) tablet, Take 1 tablet by mouth Daily., Disp: 90 tablet, Rfl: 3    sildenafil (VIAGRA) 100 MG tablet, Take 1 tablet by mouth Daily As Needed for Erectile Dysfunction., Disp: 30 tablet, Rfl: 3    hydrOXYzine (ATARAX) 25 MG tablet, Take 1 tablet by mouth every night at bedtime., Disp: 30 tablet, Rfl: 1    melatonin 5 MG tablet tablet, Take 1 tablet by mouth Every Night., Disp: 30 tablet, Rfl: 5       Follow Up   Return in about 2 weeks (around 4/16/2024) for ED, Anxiety/insomnia, Inattentiveness, Apneic episodes, Weight management, 30-minute visit.  Patient was given instructions and counseling regarding his condition or for health maintenance advice. Please see specific information pulled into the AVS if appropriate.

## 2024-04-03 LAB
BASOPHILS # BLD AUTO: 0.03 10*3/MM3 (ref 0–0.2)
BASOPHILS NFR BLD AUTO: 0.4 % (ref 0–1.5)
BUN SERPL-MCNC: 13 MG/DL (ref 6–20)
BUN/CREAT SERPL: 10.2 (ref 7–25)
CALCIUM SERPL-MCNC: 10.4 MG/DL (ref 8.6–10.5)
CHLORIDE SERPL-SCNC: 105 MMOL/L (ref 98–107)
CO2 SERPL-SCNC: 22.6 MMOL/L (ref 22–29)
CREAT SERPL-MCNC: 1.28 MG/DL (ref 0.76–1.27)
EGFRCR SERPLBLD CKD-EPI 2021: 80.6 ML/MIN/1.73
EOSINOPHIL # BLD AUTO: 0.11 10*3/MM3 (ref 0–0.4)
EOSINOPHIL NFR BLD AUTO: 1.5 % (ref 0.3–6.2)
ERYTHROCYTE [DISTWIDTH] IN BLOOD BY AUTOMATED COUNT: 13.1 % (ref 12.3–15.4)
GLUCOSE SERPL-MCNC: 94 MG/DL (ref 65–99)
HCT VFR BLD AUTO: 47.2 % (ref 37.5–51)
HCV IGG SERPL QL IA: NON REACTIVE
HGB BLD-MCNC: 15.6 G/DL (ref 13–17.7)
IMM GRANULOCYTES # BLD AUTO: 0.05 10*3/MM3 (ref 0–0.05)
IMM GRANULOCYTES NFR BLD AUTO: 0.7 % (ref 0–0.5)
LYMPHOCYTES # BLD AUTO: 2 10*3/MM3 (ref 0.7–3.1)
LYMPHOCYTES NFR BLD AUTO: 26.9 % (ref 19.6–45.3)
MCH RBC QN AUTO: 27.3 PG (ref 26.6–33)
MCHC RBC AUTO-ENTMCNC: 33.1 G/DL (ref 31.5–35.7)
MCV RBC AUTO: 82.7 FL (ref 79–97)
MONOCYTES # BLD AUTO: 0.54 10*3/MM3 (ref 0.1–0.9)
MONOCYTES NFR BLD AUTO: 7.3 % (ref 5–12)
NEUTROPHILS # BLD AUTO: 4.71 10*3/MM3 (ref 1.7–7)
NEUTROPHILS NFR BLD AUTO: 63.2 % (ref 42.7–76)
NRBC BLD AUTO-RTO: 0 /100 WBC (ref 0–0.2)
PLATELET # BLD AUTO: 239 10*3/MM3 (ref 140–450)
POTASSIUM SERPL-SCNC: 4.6 MMOL/L (ref 3.5–5.2)
RBC # BLD AUTO: 5.71 10*6/MM3 (ref 4.14–5.8)
SODIUM SERPL-SCNC: 140 MMOL/L (ref 136–145)
TESTOST SERPL-MCNC: 247 NG/DL (ref 264–916)
WBC # BLD AUTO: 7.44 10*3/MM3 (ref 3.4–10.8)

## 2024-05-29 ENCOUNTER — OFFICE VISIT (OUTPATIENT)
Dept: SLEEP MEDICINE | Facility: HOSPITAL | Age: 24
End: 2024-05-29
Payer: COMMERCIAL

## 2024-05-29 VITALS
WEIGHT: 310 LBS | OXYGEN SATURATION: 98 % | SYSTOLIC BLOOD PRESSURE: 138 MMHG | DIASTOLIC BLOOD PRESSURE: 86 MMHG | BODY MASS INDEX: 36.6 KG/M2 | HEIGHT: 77 IN | HEART RATE: 86 BPM

## 2024-05-29 DIAGNOSIS — E66.01 MORBID OBESITY WITH BODY MASS INDEX OF 40.0-49.9: ICD-10-CM

## 2024-05-29 DIAGNOSIS — R06.83 LOUD SNORING: ICD-10-CM

## 2024-05-29 DIAGNOSIS — R06.81 WITNESSED APNEIC SPELLS: Primary | ICD-10-CM

## 2024-05-29 PROCEDURE — G0463 HOSPITAL OUTPT CLINIC VISIT: HCPCS

## 2024-05-29 NOTE — PROGRESS NOTES
"[facility]  Telephone: [phone] / Fax: [phone] [place]  Telephone: [phone] / Fax: [phone] [place]    Referring Physician: Herson Bustos MD  PCP: Herson Bustos MD    Reason for consult:  sleep apnea    Roger Liu is a 24 y.o.male  was seen in the Sleep Disorders Center today for evaluation of sleep apnea.  He reports witnessed apneas, loud snoring for years. Bed partner feels like he is dying in his sleep. There are no choking type episodes. He feels tired when he wakes up in the morning and feels that he cannot get out of bed. In the past 5 years he gained 25 lbs. No prior RAZ eval to date has been performed.  Medication and history reviewed. Bedtime schedule is 9pm-4am.    SH- works in [employer], current smoker since age 15, drinks tea/soda, no alcohol.    ROS- +recurrent nose bleeds, +ear pain, +sores in the the mouth, +neck pain, +fatigue, +SOA, +dizziness, +anxiety, +excessive thirst    Roger Liu  has no past medical history on file.    Current Medications:    Current Outpatient Medications:     Cholecalciferol (Vitamin D) 50 MCG (2000 UT) tablet, Take 1 tablet by mouth Daily., Disp: 90 tablet, Rfl: 3    hydrOXYzine (ATARAX) 25 MG tablet, Take 1 tablet by mouth every night at bedtime., Disp: 30 tablet, Rfl: 1    melatonin 5 MG tablet tablet, Take 1 tablet by mouth Every Night., Disp: 30 tablet, Rfl: 5    sildenafil (VIAGRA) 100 MG tablet, Take 1 tablet by mouth Daily As Needed for Erectile Dysfunction., Disp: 30 tablet, Rfl: 3    I have reviewed Past Medical History, Past Surgical History, Medication List, Social History and Family History as entered in Sleep Questionnaire and EPIC.    ESS  12   Vital Signs /86   Pulse 86   Ht 195.6 cm (77\")   Wt (!) 141 kg (310 lb)   SpO2 98%   BMI 36.76 kg/m²  Body mass index is 36.76 kg/m².    General Alert and oriented. No acute distress noted   Pharynx/Throat Class  IV   Mallampati " airway, large tongue, no evidence of redundant lateral pharyngeal tissue. No oral lesions. No thrush. Moist mucous membranes.   Head Normocephalic. Symmetrical. Atraumatic.    Nose No septal deviation. No drainage   Chest Wall Normal shape. Symmetric expansion with respiration. No tenderness.   Neck Trachea midline, no thyromegaly or adenopathy    Lungs Clear to auscultation bilaterally. No wheezes. No rhonchi. No rales. Respirations regular, even and unlabored.   Heart Regular rhythm and normal rate. Normal S1 and S2. No murmur   Abdomen Soft, non-tender and non-distended. Normal bowel sounds. No masses.   Extremities Moves all extremities well. No edema   Psychiatric Normal mood and affect.        Impression:  1. Witnessed apneic spells    2. Loud snoring    3. Morbid obesity with body mass index of 40.0-49.9          Plan:  I discussed the pathophysiology of obstructive sleep apnea with the patient.  We discussed the adverse outcomes associated with untreated sleep-disordered breathing.      We discussed treatment modalities of obstructive sleep apnea including CPAP device, inspire device and oral appliance. Sleep study will be scheduled to establish a definitive diagnosis of sleep disorder breathing.     Patient is motivated to lose weight. Weight loss will be strongly beneficial in order to reduce the severity of sleep-disordered breathing.      Caution during activities that require prolonged concentration is strongly advised.  After sleep study results are available, patient will be notified, and appointment will be scheduled to discuss sleep study results and treatment recommendations.        I appreciate the opportunity to participate in this patient's care.      GAGAN Murray  [facility]  Phone: [phone]      Part of this note may be an electronic transcription/translation of spoken language to printed text using the Dragon Dictation System. Some errors may exist even though the  document was edited.

## 2024-06-07 ENCOUNTER — HOSPITAL ENCOUNTER (OUTPATIENT)
Dept: SLEEP MEDICINE | Facility: HOSPITAL | Age: 24
Discharge: HOME | End: 2024-06-07
Admitting: NURSE PRACTITIONER
Payer: COMMERCIAL

## 2024-06-07 DIAGNOSIS — R06.83 LOUD SNORING: ICD-10-CM

## 2024-06-07 DIAGNOSIS — R06.81 WITNESSED APNEIC SPELLS: ICD-10-CM

## 2024-06-07 DIAGNOSIS — E66.01 MORBID OBESITY WITH BODY MASS INDEX OF 40.0-49.9: ICD-10-CM

## 2024-06-07 PROCEDURE — 95806 SLEEP STUDY UNATT&RESP EFFT: CPT

## 2024-06-19 ENCOUNTER — TELEPHONE (OUTPATIENT)
Dept: SLEEP MEDICINE | Facility: HOSPITAL | Age: 24
End: 2024-06-19
Payer: COMMERCIAL

## 2024-06-20 DIAGNOSIS — G47.419 NARCOLEPSY WITHOUT CATAPLEXY: ICD-10-CM

## 2024-06-20 DIAGNOSIS — G47.10 HYPERSOMNOLENCE: Primary | ICD-10-CM

## 2024-06-20 RX ORDER — ZOLPIDEM TARTRATE 5 MG/1
TABLET ORAL
Qty: 2 TABLET | Refills: 0 | Status: SHIPPED | OUTPATIENT
Start: 2024-06-20

## 2024-07-12 ENCOUNTER — OFFICE VISIT (OUTPATIENT)
Dept: FAMILY MEDICINE CLINIC | Facility: CLINIC | Age: 24
End: 2024-07-12
Payer: COMMERCIAL

## 2024-07-12 VITALS
WEIGHT: 312 LBS | HEIGHT: 77 IN | OXYGEN SATURATION: 98 % | DIASTOLIC BLOOD PRESSURE: 78 MMHG | BODY MASS INDEX: 36.84 KG/M2 | HEART RATE: 84 BPM | TEMPERATURE: 98.6 F | SYSTOLIC BLOOD PRESSURE: 132 MMHG

## 2024-07-12 DIAGNOSIS — B00.9 HSV INFECTION: ICD-10-CM

## 2024-07-12 DIAGNOSIS — M54.42 CHRONIC BILATERAL LOW BACK PAIN WITH LEFT-SIDED SCIATICA: Primary | ICD-10-CM

## 2024-07-12 DIAGNOSIS — G89.29 CHRONIC BILATERAL LOW BACK PAIN WITH LEFT-SIDED SCIATICA: Primary | ICD-10-CM

## 2024-07-12 DIAGNOSIS — N52.9 ERECTILE DYSFUNCTION, UNSPECIFIED ERECTILE DYSFUNCTION TYPE: ICD-10-CM

## 2024-07-12 PROCEDURE — 99214 OFFICE O/P EST MOD 30 MIN: CPT | Performed by: STUDENT IN AN ORGANIZED HEALTH CARE EDUCATION/TRAINING PROGRAM

## 2024-07-12 RX ORDER — SILDENAFIL 100 MG/1
100 TABLET, FILM COATED ORAL DAILY PRN
Qty: 30 TABLET | Refills: 3 | Status: SHIPPED | OUTPATIENT
Start: 2024-07-12

## 2024-07-12 RX ORDER — FAMCICLOVIR 250 MG/1
750 TABLET ORAL 2 TIMES DAILY
Qty: 6 TABLET | Refills: 1 | Status: SHIPPED | OUTPATIENT
Start: 2024-07-12

## 2024-07-12 RX ORDER — CYCLOBENZAPRINE HCL 5 MG
5 TABLET ORAL 3 TIMES DAILY PRN
Qty: 45 TABLET | Refills: 1 | Status: SHIPPED | OUTPATIENT
Start: 2024-07-12

## 2024-07-12 NOTE — LETTER
July 12, 2024     Patient: Roger Liu   YOB: 2000   Date of Visit: 7/12/2024       To Whom It May Concern:    Roger is my patient, and has been under my care since 3/5/2024.  I am familiar with his medical history which imposes limits on some of his physical activities.  In order to in order to address these limitations and help Roger function as normally as possible, he needs to limit the amount of time he stands throughout the day.  Be able to do his work in a seated position is much as possible is required.  Please do your best to provide appropriate seating for Roger to do the bulk of his work.    If Roger is agreeable, I would be happy to answer other questions you may have concerning my recommendation.           Sincerely,        Herson Bustos MD    CC: No Recipients

## 2024-07-12 NOTE — PROGRESS NOTES
"Chief Complaint  Back Pain and Mouth Lesions    Subjective        Roger Liu presents to [facility]  History of Present Illness  History of Present Illness  Roger is an established patient presenting with his significant other for back pain and mouth lesion.  He has hypercholesterolemia, vitamin D deficiency, ED.  Past concerns of anxiety/insomnia, apneic episodes, inattentiveness.  3/6/2024 labs showing normal CBC, CMP, TSH, low vitamin D, elevated total cholesterol, LDL, and HDL, slightly low testosterone.     About a year and a half ago, while lifting logs, he twisted his back, which has since been causing him significant discomfort. The pain intensifies when he stands for extended periods, and he describes the sensation as if his bones are rubbing together.  He denies any numbness or tingling in his legs, but experiences pain that originates in his left buttock and radiates down the back of his leg, typically when the pain is severe. He has not taken any painkillers and usually manages the pain by sitting down for a minute. The pain intensifies when he leans back . He has no history of back surgery. He has a herniated disc in his neck.  His mother provided him with a muscle relaxer, which did not provide relief.    He has a fever blister-type ulcer in his mouth, which he frequently gets.  He gets also them under his tongue. These ulcers have been present for years, which he attributes to stress. He always gets them on the inside of his lip.    He is currently taking vitamin D 5000 IU intervention units and Viagra as needed.    Objective   Vital Signs:  /78   Pulse 84   Temp 98.6 °F (37 °C)   Ht 195.6 cm (77\")   Wt (!) 142 kg (312 lb)   SpO2 98%   BMI 37.00 kg/m²   Estimated body mass index is 37 kg/m² as calculated from the following:    Height as of this encounter: 195.6 cm (77\").    Weight as of this encounter: 142 kg (312 lb).               Physical Exam  Vitals " and nursing note reviewed.   Constitutional:       General: He is not in acute distress.     Appearance: Normal appearance.   HENT:      Head: Normocephalic and atraumatic.      Mouth/Throat:      Mouth: Oral lesions (5mm maria teresa ulceration inner left lower lip) present.   Eyes:      Extraocular Movements: Extraocular movements intact.      Conjunctiva/sclera: Conjunctivae normal.   Cardiovascular:      Rate and Rhythm: Normal rate and regular rhythm.      Heart sounds: Normal heart sounds. No murmur heard.     No friction rub. No gallop.   Pulmonary:      Effort: Pulmonary effort is normal.      Breath sounds: Normal breath sounds. No wheezing, rhonchi or rales.   Musculoskeletal:      Lumbar back: Tenderness (L5-S1 spinal and right paraspinal) and bony tenderness (L5-S1) present. Negative right straight leg raise test (Elicits low back pain) and negative left straight leg raise test (Elicits low back pain).   Neurological:      General: No focal deficit present.      Mental Status: He is alert. Mental status is at baseline.   Psychiatric:         Mood and Affect: Mood normal.         Behavior: Behavior normal.        Physical Exam      Result Review :          Results  Laboratory Studies  Testosterone levels were borderline but next to the normal range.             Assessment and Plan     Diagnoses and all orders for this visit:    1. Chronic bilateral low back pain with left-sided sciatica (Primary)  -     XR Spine Lumbar 2 or 3 View; Future  -     cyclobenzaprine (FLEXERIL) 5 MG tablet; Take 1 tablet by mouth 3 (Three) Times a Day As Needed for Muscle Spasms.  Dispense: 45 tablet; Refill: 1  -     Ambulatory Referral to Physical Therapy    2. HSV infection  -     famciclovir (FAMVIR) 250 MG tablet; Take 3 tablets by mouth 2 (Two) Times a Day. For fever blisters.  Dispense: 6 tablet; Refill: 1    3. Erectile dysfunction, unspecified erectile dysfunction type  -     sildenafil (VIAGRA) 100 MG tablet; Take 1 tablet by  mouth Daily As Needed for Erectile Dysfunction.  Dispense: 30 tablet; Refill: 3      Assessment & Plan  1. Back pain.  An x-ray of the back was ordered. Flexeril was prescribed, with instructions to take it at night. Ibuprofen was suggested for pain management, to be taken with food. A referral to physical therapy was made.    2. Fever blister.  Antiviral prescribed.    3. Erectile dysfunction.  Viagra refilled.  Lifestyle modifications were recommended. Vitamin D supplementation was recommended.  Following through with diet for weight loss encouraged.    Follow-up  A follow-up appointment is scheduled for 3 months from now.         Follow Up     Return in about 3 months (around 10/12/2024) for Back pain.  Patient was given instructions and counseling regarding his condition or for health maintenance advice. Please see specific information pulled into the AVS if appropriate.     Patient or patient representative verbalized consent for the use of Ambient Listening during the visit with  Herson Bustos MD for chart documentation. 7/12/2024  21:36 EDT

## 2024-08-16 ENCOUNTER — TELEPHONE (OUTPATIENT)
Dept: FAMILY MEDICINE CLINIC | Facility: CLINIC | Age: 24
End: 2024-08-16
Payer: COMMERCIAL

## 2024-08-16 NOTE — TELEPHONE ENCOUNTER
Spoke with Roger Liu regarding this X ray. He stated that he has not completed it. He said that he did not know where to go.   He is out of town but he will back on Monday. He wants to complete this X ray at the [facility]. He stated that he will do that on Monday after work.

## 2024-08-23 ENCOUNTER — TELEPHONE (OUTPATIENT)
Dept: FAMILY MEDICINE CLINIC | Facility: CLINIC | Age: 24
End: 2024-08-23

## 2024-08-26 ENCOUNTER — TELEPHONE (OUTPATIENT)
Dept: FAMILY MEDICINE CLINIC | Facility: CLINIC | Age: 24
End: 2024-08-26
Payer: COMMERCIAL

## 2024-08-26 NOTE — TELEPHONE ENCOUNTER
Spoke to pt about overdue xray, he stated he hadnt completed yet he was going to go today after work. I will postpone for 1 week

## 2024-09-17 ENCOUNTER — TELEPHONE (OUTPATIENT)
Dept: FAMILY MEDICINE CLINIC | Facility: CLINIC | Age: 24
End: 2024-09-17
Payer: COMMERCIAL

## 2024-10-02 ENCOUNTER — TELEPHONE (OUTPATIENT)
Dept: FAMILY MEDICINE CLINIC | Facility: CLINIC | Age: 24
End: 2024-10-02
Payer: COMMERCIAL

## 2024-10-04 ENCOUNTER — TELEPHONE (OUTPATIENT)
Dept: FAMILY MEDICINE CLINIC | Facility: CLINIC | Age: 24
End: 2024-10-04
Payer: COMMERCIAL

## 2024-10-04 NOTE — TELEPHONE ENCOUNTER
HUB TO RELAY  I TRIED TO CALL PATIENT IN REGARDS TO  LEAVING BUT THE CALL WAS ENDED. I REMOVED  AS HIS PROVIDER AND CANCELLED HIS APPT AS IT WAS MY 3RD ATTEMPT TO REACH HIM

## 2024-10-21 ENCOUNTER — TELEPHONE (OUTPATIENT)
Dept: FAMILY MEDICINE CLINIC | Facility: CLINIC | Age: 24
End: 2024-10-21
Payer: COMMERCIAL

## 2024-10-21 NOTE — TELEPHONE ENCOUNTER
Pt can not complete the order as PCP is no longer with the practice. Pt has not transferred care. If pt transfers care then order can be replaced

## 2024-10-21 NOTE — TELEPHONE ENCOUNTER
----- Message from Robe Mishra sent at 10/16/2024  2:00 PM EDT -----  Do I need to replace the order, or did patient refused to do the test?  ----- Message -----  From: Susan Walker MA  Sent: 10/15/2024   8:01 AM EDT  To: Herson Bustos MD

## 2025-07-10 ENCOUNTER — APPOINTMENT (OUTPATIENT)
Dept: GENERAL RADIOLOGY | Facility: HOSPITAL | Age: 25
End: 2025-07-10
Payer: MEDICAID

## 2025-07-10 ENCOUNTER — HOSPITAL ENCOUNTER (OUTPATIENT)
Facility: HOSPITAL | Age: 25
Discharge: HOME | End: 2025-07-10
Attending: EMERGENCY MEDICINE | Admitting: EMERGENCY MEDICINE
Payer: MEDICAID

## 2025-07-10 VITALS
HEIGHT: 76 IN | OXYGEN SATURATION: 97 % | HEART RATE: 87 BPM | WEIGHT: 311.8 LBS | BODY MASS INDEX: 37.97 KG/M2 | TEMPERATURE: 98.1 F | RESPIRATION RATE: 18 BRPM | DIASTOLIC BLOOD PRESSURE: 86 MMHG | SYSTOLIC BLOOD PRESSURE: 155 MMHG

## 2025-07-10 DIAGNOSIS — S99.911A INJURY OF RIGHT ANKLE, INITIAL ENCOUNTER: ICD-10-CM

## 2025-07-10 DIAGNOSIS — Z87.39 HISTORY OF GOUT: Primary | ICD-10-CM

## 2025-07-10 DIAGNOSIS — M25.571 ACUTE RIGHT ANKLE PAIN: ICD-10-CM

## 2025-07-10 PROCEDURE — 73610 X-RAY EXAM OF ANKLE: CPT

## 2025-07-10 PROCEDURE — G0463 HOSPITAL OUTPT CLINIC VISIT: HCPCS

## 2025-07-10 RX ORDER — INDOMETHACIN 50 MG/1
50 CAPSULE ORAL
Qty: 21 CAPSULE | Refills: 0 | Status: SHIPPED | OUTPATIENT
Start: 2025-07-10 | End: 2025-07-17

## 2025-07-10 NOTE — FSED PROVIDER NOTE
Subjective   History of Present Illness  25-year-old male presents for evaluation of right ankle pain and swelling.  Patient reports history of chronic gout.  His flares commonly occur in his ankles and toes.  He states he began to have a flare in his right ankle approximately 2 weeks ago.  He went to another urgent care, where he was prescribed colchicine.  Patient states he has taken his colchicine in its entirety.  He felt like his ankle was improving somewhat, however he stepped out of the vehicle 2 days ago and twisted his ankle, which he feels has exacerbated the pain and swelling.  Patient concerned for injury as well as unresolved gout attack.  He currently is not established with a primary care provider, therefore he presents today for an evaluation.  He does not take chronic gout medication.  In the past, he has taken ibuprofen for his gout attacks.  He denies any fevers, dizziness, numbness, tingling, weakness, loss of mobility, or any other associated symptoms    History provided by:  Patient      Review of Systems   Constitutional:  Negative for chills, fatigue and fever.   Respiratory:  Negative for cough and shortness of breath.    Cardiovascular:  Negative for chest pain and leg swelling.   Gastrointestinal:  Negative for abdominal pain, constipation, diarrhea, nausea and vomiting.   Genitourinary:  Negative for difficulty urinating and flank pain.   Musculoskeletal:         Negative except for HPI   Skin:  Negative for color change and pallor.   Neurological:  Negative for dizziness, syncope, weakness, light-headedness and headaches.   All other systems reviewed and are negative.      Past Medical History:   Diagnosis Date    Gout     Hypertension        Allergies   Allergen Reactions    Ibuprofen GI Intolerance       Past Surgical History:   Procedure Laterality Date    HAND SURGERY Right        History reviewed. No pertinent family history.    Social History     Socioeconomic History    Marital  status: Single   Tobacco Use    Smoking status: Every Day     Current packs/day: 3.00     Average packs/day: 3.0 packs/day for 9.2 years (27.6 ttl pk-yrs)     Types: Electronic Cigarette, Cigarettes     Start date: 5/1/2016    Smokeless tobacco: Never    Tobacco comments:     I wish i could stop   Vaping Use    Vaping status: Every Day    Substances: Nicotine    Devices: Pre-filled or refillable cartridge   Substance and Sexual Activity    Alcohol use: Not Currently     Comment: sober since 2024    Drug use: No    Sexual activity: Yes     Partners: Female     Birth control/protection: Condom, Partner of same sex           Objective   Physical Exam  Vitals and nursing note reviewed.   Constitutional:       Appearance: Normal appearance. He is not ill-appearing or toxic-appearing.   HENT:      Head: Normocephalic and atraumatic.   Eyes:      Pupils: Pupils are equal, round, and reactive to light.   Cardiovascular:      Rate and Rhythm: Normal rate and regular rhythm.   Pulmonary:      Effort: Pulmonary effort is normal. No respiratory distress.      Breath sounds: Normal breath sounds.   Abdominal:      General: Abdomen is flat. Bowel sounds are normal.      Palpations: Abdomen is soft.      Tenderness: There is no abdominal tenderness.   Musculoskeletal:      Cervical back: Normal range of motion and neck supple.      Comments: There is tenderness and swelling about the lateral aspect of the ankle at the joint, radiating into the top of the foot.  No tenderness into the calf or distal foot.  There is no erythema or bruising.  Mild warmth compared to the contralateral.  Patient is able to ambulate on the affected extremity, although he states it is painful.  Peripheral pulses +2.  There is no pallor.  No numbness or tingling.  Neurovascularly intact.   Skin:     General: Skin is warm and dry.      Coloration: Skin is not pale.   Neurological:      General: No focal deficit present.      Mental Status: He is alert and  oriented to person, place, and time.   Psychiatric:         Mood and Affect: Mood normal.         Procedures           ED Course  ED Course as of 07/10/25 1923   Thu Jul 10, 2025   1923 BP: 155/86 [SS]   1923 Temp: 98.1 °F (36.7 °C) [SS]   1923 Heart Rate: 87 [SS]   1923 Resp: 18 [SS]   1923 SpO2: 97 % [SS]      ED Course User Index  [SS] Ron Patrica CORTEZ, GAGAN                                           Medical Decision Making  25-year-old male presents for evaluation of right ankle pain and swelling.  Patient verbalizes a gout flare onset 2 weeks ago, for which he has taken colchicine.  He states he felt that his gout was improving, however he did roll his ankle yesterday which has exacerbated his pain and swelling.  Patient presents specifically with concerns of unresolved gout attack and possible ankle injury.  There is localized tenderness and swelling about the right ankle, along with some mild warmth.  No erythema.  Neurovascularly intact.  X-ray of the right ankle shows soft tissue swelling, however no acute osseous abnormalities.  I did consider possible septic joint given finding of warmth and swelling, although he does not have risk factors for septic joint.  I also considered gout versus ankle sprain.  I asked my attending physician, Dr. Ochoa to evaluate this patient with me at bedside.  Dr. Ochoa does not suspect this to be a septic joint.  He recommends treating for gout with indomethacin, and refer patient to family medicine to get him established for long-term gout management.  Patient is agreeable with this plan of care.  Patient is afebrile and nontoxic in appearance.  Patient instructed to return at anytime for new, worrisome, or worsening symptoms.  Educated on red flag signs and symptoms that warrant immediate return.  All questions and concerns have been addressed.  Patient agreeable with the proposed plan of care.    Amount and/or Complexity of Data Reviewed  Radiology: ordered. Decision-making  details documented in ED Course.        Final diagnoses:   History of gout   Acute right ankle pain   Injury of right ankle, initial encounter       ED Disposition  ED Disposition       ED Disposition   Discharge    Condition   Stable    Comment   --               Gregory Betancourt, APRN  [address] [id number]  [phone]    In 1 week           Medication List        New Prescriptions      indomethacin 50 MG capsule  Commonly known as: INDOCIN  Take 1 capsule by mouth 3 (Three) Times a Day With Meals for 7 days.               Where to Get Your Medications        These medications were sent to [id number] - [address] AT [address] - [phone]  - [phone] [facility], [facility], [place] [address]      Phone: [phone]   indomethacin 50 MG capsule

## 2025-07-10 NOTE — DISCHARGE INSTRUCTIONS
Please take your medication as prescribed.  I recommend resting your right ankle over the next week.  RICE therapy and Tylenol in addition to your prescribed medication for comfort.  Please call and schedule an appointment to get established with primary care.  Return at anytime for new, worrisome, or worsening symptoms.  Thank you for allowing us to take part in your care today.